# Patient Record
Sex: MALE | Race: WHITE | NOT HISPANIC OR LATINO | Employment: FULL TIME | URBAN - NONMETROPOLITAN AREA
[De-identification: names, ages, dates, MRNs, and addresses within clinical notes are randomized per-mention and may not be internally consistent; named-entity substitution may affect disease eponyms.]

---

## 2019-06-20 ENCOUNTER — OFFICE VISIT (OUTPATIENT)
Dept: NEUROSURGERY | Facility: CLINIC | Age: 39
End: 2019-06-20

## 2019-06-20 VITALS
RESPIRATION RATE: 18 BRPM | SYSTOLIC BLOOD PRESSURE: 140 MMHG | WEIGHT: 176.6 LBS | DIASTOLIC BLOOD PRESSURE: 93 MMHG | OXYGEN SATURATION: 100 % | TEMPERATURE: 97.7 F | BODY MASS INDEX: 23.4 KG/M2 | HEIGHT: 73 IN | HEART RATE: 79 BPM

## 2019-06-20 DIAGNOSIS — M50.30 DEGENERATIVE DISC DISEASE, CERVICAL: Primary | ICD-10-CM

## 2019-06-20 PROCEDURE — 99203 OFFICE O/P NEW LOW 30 MIN: CPT | Performed by: NEUROLOGICAL SURGERY

## 2019-06-20 RX ORDER — MELOXICAM 7.5 MG/1
7.5 TABLET ORAL 2 TIMES DAILY
Qty: 60 TABLET | Refills: 0 | Status: SHIPPED | OUTPATIENT
Start: 2019-06-20

## 2019-06-20 RX ORDER — HYDROCODONE BITARTRATE AND ACETAMINOPHEN 5; 325 MG/1; MG/1
TABLET ORAL
Refills: 0 | COMMUNITY
Start: 2019-06-03

## 2019-06-20 NOTE — PROGRESS NOTES
El Wise  1980  [id number]      Chief Complaint   Patient presents with   • Neck Pain     x1 year       HISTORY OF PRESENT ILLNESS: This is a 38-year-old who has an approximately 1 year history of pain in the cervical area which on occasionally radiates into his upper extremities.  The radicular component is ill-defined, nondermatomal and not that usually associated with nerve root or spinal cord compression.  He has been intermittently to physical therapy however has not been instructed in the [facility] program.  He takes nonsteroidals infrequently.  He uses Lortab infrequently as well.  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    Past Medical History:   Diagnosis Date   • Neck pain        Past Surgical History:   Procedure Laterality Date   • CHOLECYSTECTOMY     • WRIST FRACTURE SURGERY         Family History   Problem Relation Age of Onset   • No Known Problems Mother    • No Known Problems Father    • No Known Problems Maternal Grandmother    • No Known Problems Maternal Grandfather    • No Known Problems Paternal Grandmother    • No Known Problems Paternal Grandfather        Social History     Socioeconomic History   • Marital status:      Spouse name: Not on file   • Number of children: Not on file   • Years of education: Not on file   • Highest education level: Not on file   Tobacco Use   • Smoking status: Former Smoker     Types: Cigarettes   • Smokeless tobacco: Never Used   Substance and Sexual Activity   • Alcohol use: No     Frequency: Never   • Drug use: No   • Sexual activity: Defer       No Known Allergies      Current Outpatient Medications:   •  DICLOFENAC PO, Take 75 mg by mouth., Disp: , Rfl:   •  HYDROcodone-acetaminophen (NORCO) 5-325 MG per tablet, TK 1 T PO QD PRN, Disp: , Rfl: 0    Review of Systems   Constitutional: Negative.    HENT: Negative.    Eyes:  "Negative.    Respiratory: Negative.    Cardiovascular: Negative.    Gastrointestinal: Negative.    Endocrine: Negative.    Genitourinary: Negative.    Musculoskeletal: Positive for myalgias, neck pain and neck stiffness.   Skin: Negative.    Allergic/Immunologic: Negative.    Neurological: Negative.    Hematological: Negative.    Psychiatric/Behavioral: Negative.        Vitals:    06/20/19 1037   BP: 140/93   BP Location: Right arm   Patient Position: Sitting   Cuff Size: Adult   Pulse: 79   Resp: 18   Temp: 97.7 °F (36.5 °C)   TempSrc: Oral   SpO2: 100%   Weight: 80.1 kg (176 lb 9.6 oz)   Height: 185.4 cm (73\")       Neurological Examination:    Mental status/speech: The patient is alert and oriented.  Speech is clear without aphysia or dysarthria.  No overt cognitive deficits.    Cranial nerve examination:    Olfaction: Smell is intact.  Vision: Vision is intact without visual field abnormalities.  Funduscopic examination is normal.  No pupillary irregularity.  Ocular motor examination: The extraocular muscles are intact.  There is no diplopia.  The pupil is round and reactive to both light and accommodation.  There is no nystagmus.  Facial movement/sensation: There is no facial weakness.  Sensation is intact in the first, second, and third divisions of the trigeminal nerve.  The corneal reflex is intact.  Auditory: Hearing is intact to finger rub bilaterally.  Cranial nerves IX, X, XI, XII: Phonation is normal.  No dysphagia.  Tongue is protruded in the midline without atrophy.  The gag reflex is intact.  Shoulder shrug is normal.    Musculoligamentous ligamentous examination: He has slight limitation of range of motion of the cervical spine unaccompanied by weakness, sensory loss or reflex asymmetry.  His gait is normal.  There is no evidence of a myelopathy.  No Babinski, Bryan or clonus.    Medical Decision Making:     Diagnostic Data Set: The cervical MRI data set show the presence of degenerative disc " disease and facet arthrosis at C5-C6 and C6-C7.  There is no high-grade spinal stenosis however.      Assessment: Cervical spondylosis          Recommendations: He has a genetic predisposition for degenerative osteoarthritis.  His symptoms are explained by the cervical MRI.  Surgery, however, is not an option.  He does not need an operation.  His neurological examination is normal.  I have sent him to physical therapy once again for instruction in the [facility] cervical program which I think will be quite helpful.  I think his symptoms are worsened with poor ergonomics.  He notes that this is worse after he drives his car.  I have given him a prescription of meloxicam 7.5 twice daily and Robaxin-750 milligrams at night.  I instructed him to take this for 1 month.  If at that time he is improved I would suggest that medicine be continued.  Alternatively, if it is not better you may wish to switch him to another NSAID.  Referral to pain management for cortisone injections likewise can be helpful.    Back and supplied additional information do not hesitate to contact me.  At this time, surgical intervention is not a consideration.        I greatly appreciate the opportunity to see and evaluate this individual.  If you have questions or concerns regarding issues that I may have overlooked please call me at any time: [phone].  Viral Delvalle M.D.  [facility]  [address] [place] [address]    Scribed for Sage Delvalle MD by Favian Yoon CMA. 6/20/2019 11:06 AM     I have read and concur with the information provided by the scribe.  Sage Delvalle MD

## 2020-11-24 ENCOUNTER — TRANSCRIBE ORDERS (OUTPATIENT)
Dept: ADMINISTRATIVE | Facility: HOSPITAL | Age: 40
End: 2020-11-24

## 2020-11-24 DIAGNOSIS — J32.4 CHRONIC PANSINUSITIS: Primary | ICD-10-CM

## 2020-12-01 ENCOUNTER — HOSPITAL ENCOUNTER (OUTPATIENT)
Dept: CT IMAGING | Facility: HOSPITAL | Age: 40
End: 2020-12-01

## 2020-12-10 ENCOUNTER — APPOINTMENT (OUTPATIENT)
Dept: CT IMAGING | Facility: HOSPITAL | Age: 40
End: 2020-12-10

## 2020-12-15 ENCOUNTER — HOSPITAL ENCOUNTER (OUTPATIENT)
Dept: CT IMAGING | Facility: HOSPITAL | Age: 40
Discharge: HOME | End: 2020-12-15
Admitting: NURSE PRACTITIONER

## 2020-12-15 DIAGNOSIS — J32.4 CHRONIC PANSINUSITIS: ICD-10-CM

## 2020-12-15 PROCEDURE — 70486 CT MAXILLOFACIAL W/O DYE: CPT | Performed by: RADIOLOGY

## 2020-12-15 PROCEDURE — 70486 CT MAXILLOFACIAL W/O DYE: CPT

## 2021-01-26 ENCOUNTER — IMMUNIZATION (OUTPATIENT)
Dept: VACCINE CLINIC | Facility: HOSPITAL | Age: 41
End: 2021-01-26

## 2021-01-26 PROCEDURE — 91300 HC SARSCOV02 VAC 30MCG/0.3ML IM: CPT | Performed by: FAMILY MEDICINE

## 2021-01-26 PROCEDURE — 0001A: CPT | Performed by: FAMILY MEDICINE

## 2021-02-16 ENCOUNTER — APPOINTMENT (OUTPATIENT)
Dept: VACCINE CLINIC | Facility: HOSPITAL | Age: 41
End: 2021-02-16

## 2021-03-01 ENCOUNTER — IMMUNIZATION (OUTPATIENT)
Dept: VACCINE CLINIC | Facility: HOSPITAL | Age: 41
End: 2021-03-01

## 2021-03-01 PROCEDURE — 91300 HC SARSCOV02 VAC 30MCG/0.3ML IM: CPT | Performed by: INTERNAL MEDICINE

## 2021-03-01 PROCEDURE — 0002A: CPT | Performed by: INTERNAL MEDICINE
